# Patient Record
Sex: MALE | Race: WHITE | NOT HISPANIC OR LATINO | URBAN - METROPOLITAN AREA
[De-identification: names, ages, dates, MRNs, and addresses within clinical notes are randomized per-mention and may not be internally consistent; named-entity substitution may affect disease eponyms.]

---

## 2017-03-17 ENCOUNTER — OUTPATIENT (OUTPATIENT)
Dept: OUTPATIENT SERVICES | Facility: HOSPITAL | Age: 62
LOS: 1 days | Discharge: HOME | End: 2017-03-17

## 2017-06-27 DIAGNOSIS — M20.42 OTHER HAMMER TOE(S) (ACQUIRED), LEFT FOOT: ICD-10-CM

## 2017-06-27 DIAGNOSIS — I10 ESSENTIAL (PRIMARY) HYPERTENSION: ICD-10-CM

## 2021-05-05 ENCOUNTER — EMERGENCY (EMERGENCY)
Facility: HOSPITAL | Age: 66
LOS: 0 days | Discharge: HOME | End: 2021-05-06
Attending: EMERGENCY MEDICINE | Admitting: EMERGENCY MEDICINE
Payer: COMMERCIAL

## 2021-05-05 VITALS
OXYGEN SATURATION: 100 % | DIASTOLIC BLOOD PRESSURE: 90 MMHG | TEMPERATURE: 97 F | WEIGHT: 214.95 LBS | SYSTOLIC BLOOD PRESSURE: 177 MMHG | RESPIRATION RATE: 18 BRPM | HEART RATE: 100 BPM

## 2021-05-05 DIAGNOSIS — F17.200 NICOTINE DEPENDENCE, UNSPECIFIED, UNCOMPLICATED: ICD-10-CM

## 2021-05-05 DIAGNOSIS — I10 ESSENTIAL (PRIMARY) HYPERTENSION: ICD-10-CM

## 2021-05-05 DIAGNOSIS — R68.2 DRY MOUTH, UNSPECIFIED: ICD-10-CM

## 2021-05-05 DIAGNOSIS — K11.20 SIALOADENITIS, UNSPECIFIED: ICD-10-CM

## 2021-05-05 DIAGNOSIS — M54.2 CERVICALGIA: ICD-10-CM

## 2021-05-05 LAB
BASOPHILS # BLD AUTO: 0.07 K/UL — SIGNIFICANT CHANGE UP (ref 0–0.2)
BASOPHILS NFR BLD AUTO: 0.6 % — SIGNIFICANT CHANGE UP (ref 0–1)
EOSINOPHIL # BLD AUTO: 0.05 K/UL — SIGNIFICANT CHANGE UP (ref 0–0.7)
EOSINOPHIL NFR BLD AUTO: 0.4 % — SIGNIFICANT CHANGE UP (ref 0–8)
HCT VFR BLD CALC: 39.5 % — LOW (ref 42–52)
HGB BLD-MCNC: 14.3 G/DL — SIGNIFICANT CHANGE UP (ref 14–18)
IMM GRANULOCYTES NFR BLD AUTO: 0.3 % — SIGNIFICANT CHANGE UP (ref 0.1–0.3)
LYMPHOCYTES # BLD AUTO: 2.44 K/UL — SIGNIFICANT CHANGE UP (ref 1.2–3.4)
LYMPHOCYTES # BLD AUTO: 21.1 % — SIGNIFICANT CHANGE UP (ref 20.5–51.1)
MCHC RBC-ENTMCNC: 32.2 PG — HIGH (ref 27–31)
MCHC RBC-ENTMCNC: 36.2 G/DL — SIGNIFICANT CHANGE UP (ref 32–37)
MCV RBC AUTO: 89 FL — SIGNIFICANT CHANGE UP (ref 80–94)
MONOCYTES # BLD AUTO: 0.81 K/UL — HIGH (ref 0.1–0.6)
MONOCYTES NFR BLD AUTO: 7 % — SIGNIFICANT CHANGE UP (ref 1.7–9.3)
NEUTROPHILS # BLD AUTO: 8.17 K/UL — HIGH (ref 1.4–6.5)
NEUTROPHILS NFR BLD AUTO: 70.6 % — SIGNIFICANT CHANGE UP (ref 42.2–75.2)
NRBC # BLD: 0 /100 WBCS — SIGNIFICANT CHANGE UP (ref 0–0)
PLATELET # BLD AUTO: 305 K/UL — SIGNIFICANT CHANGE UP (ref 130–400)
RBC # BLD: 4.44 M/UL — LOW (ref 4.7–6.1)
RBC # FLD: 11.9 % — SIGNIFICANT CHANGE UP (ref 11.5–14.5)
WBC # BLD: 11.58 K/UL — HIGH (ref 4.8–10.8)
WBC # FLD AUTO: 11.58 K/UL — HIGH (ref 4.8–10.8)

## 2021-05-05 PROCEDURE — 99285 EMERGENCY DEPT VISIT HI MDM: CPT

## 2021-05-05 RX ORDER — SODIUM CHLORIDE 9 MG/ML
1000 INJECTION INTRAMUSCULAR; INTRAVENOUS; SUBCUTANEOUS ONCE
Refills: 0 | Status: COMPLETED | OUTPATIENT
Start: 2021-05-05 | End: 2021-05-05

## 2021-05-05 NOTE — ED PROVIDER NOTE - OBJECTIVE STATEMENT
64 yo male hx of HTN/DM/CAD s/p stent (about 1 year ago on Brilinta)/ BPH/ kidney stone 10 years ago present c/o right flank pain and hematuria since yesterday. Reported multiple bloody urine (pink to dark pink) since yesterday. pain to right lower flank and radiate to his right leg since this am. pain subsided in the past few days. Denies dysuria/urgency/frequency. denies penile discharge/lesion and testicles pain and swelling. Denies fever/chill/HA/dizziness/chest pain/palpitation/sob/abd pain/n/v/d/ black stool/bloody stool 64 yo male hx of HTN present c/o right sided neck pain started around 6 pm after dinner tonight. also c/o dried mouth. reported he was fasting for the holiday and started eating and feeling right sided neck pain/swelling tonight. denies similar episode in the past. denies fever/chill/recent illness/coughing/chest pain/abd pain/n/v/d,

## 2021-05-05 NOTE — ED PROVIDER NOTE - NS ED ROS FT
Constitutional: no fever, chills, no recent weight loss, change in appetite or malaise  Eyes: no redness/discharge/pain/vision changes  ENT: no rhinorrhea/ear pain/sore throat  Cardiac: No chest pain, SOB or edema.  Respiratory: No cough or respiratory distress  GI: No nausea, vomiting, diarrhea or abdominal pain.  : see HPI  MS: no pain to back or extremities, no loss of ROM, no weakness  Neuro: No headache or weakness. No LOC.  Skin: No skin rash.  Endocrine: No history of thyroid disease or diabetes. Constitutional: no fever, chills, no recent weight loss, change in appetite or malaise  Eyes: no redness/discharge/pain/vision changes  ENT: see HPI  Cardiac: No chest pain, SOB or edema.  Respiratory: No cough or respiratory distress  GI: No nausea, vomiting, diarrhea or abdominal pain.  MS: no pain to back or extremities, no loss of ROM, no weakness  Neuro: No headache or weakness. No LOC.  Skin: No skin rash.  Endocrine: No history of thyroid disease or diabetes.

## 2021-05-05 NOTE — ED PROVIDER NOTE - PATIENT PORTAL LINK FT
You can access the FollowMyHealth Patient Portal offered by [facility] by registering at the following website: [email or web address]. By joining [employer]’s FollowMyHealth portal, you will also be able to view your health information using other applications (apps) compatible with our system.

## 2021-05-05 NOTE — ED PROVIDER NOTE - ATTENDING CONTRIBUTION TO CARE
65M h/o htn p/w R neck swelling x 1d. Has been fasting, did not eat anything since early this morning, then ate dinner around 8:30pm after which dvlpd R submandibular/ant neck swelling. No other sx. No f/c, diff swallowing or breathing. PCP Mehnaz.    PE:  nad  skin warm, dry  ncat  perrl/eomi  ent- no facial swelling, op clear pharynx nl, no stridor/drooling  neck- +swelling to R submandibular/ant neck, mobile, mild ttp, trachea midline  rrr nl s1s2 no mrg  ctab no wrr  abd soft ntnd no palpable masses no rgr  back non-tender no cvat  ext no cce dpi  neuro aaox3 grossly nf exam

## 2021-05-05 NOTE — ED PROVIDER NOTE - PMH
BPH (benign prostatic hyperplasia)    CAD (coronary artery disease)    Diabetes mellitus    HTN (hypertension)    Kidney stone     HTN (hypertension)

## 2021-05-05 NOTE — ED PROVIDER NOTE - PROGRESS NOTE DETAILS
incidental CT finding regarding carotid disclosed to patient. f/u with PMD for outpatient carotid duplex. copy of result given to patient.

## 2021-05-05 NOTE — ED PROVIDER NOTE - CARE PROVIDER_API CALL
Swapnil Darby [facility]  [address] [place] [address]  Phone: [phone]  Fax: [phone]  Follow Up Time:     Reno Mcdonnell)  Otolaryngology  [address] [place] [address], [address] [place] [address]  Phone: [phone]  Fax: [phone]  Follow Up Time:

## 2021-05-05 NOTE — ED PROVIDER NOTE - NSFOLLOWUPINSTRUCTIONS_ED_ALL_ED_FT
Sialoadenitis    WHAT YOU NEED TO KNOW:    Sialoadenitis is an inflammation or infection of one or more of your salivary glands. A small stone can block the salivary gland and cause inflammation. Infection may be caused by a virus or bacteria. You can develop sialoadenitis on one or both sides of your face.     DISCHARGE INSTRUCTIONS:    Return to the emergency department if:   •You have trouble breathing or swallowing because of swelling.          Call your doctor if:   •You have trouble opening your mouth because of swelling.      •Your salivary gland gets more red and hot or drains more pus.      •The pain and swelling do not go away within 2 days, or they get worse.       •Your mouth is very dry.       •You lose movement on one side of your face.       •You have questions about your condition or care.      Medicines: You may need one or more of the following:   •Antibiotics may be given to treat a bacterial infection.       •Acetaminophen decreases pain and fever. It is available without a doctor's order. Ask how much to give your child and how often to give it. Follow directions. Read the labels of all other medicines your child uses to see if they also contain acetaminophen, or ask your child's doctor or pharmacist. Acetaminophen can cause liver damage if not taken correctly.      •NSAIDs, such as ibuprofen, help decrease swelling, pain, and fever. This medicine is available with or without a doctor's order. NSAIDs can cause stomach bleeding or kidney problems in certain people. If you take blood thinner medicine, always ask your healthcare provider if NSAIDs are safe for you. Always read the medicine label and follow directions.      •Take your medicine as directed. Contact your healthcare provider if you think your medicine is not helping or if you have side effects. Tell him of her if you are allergic to any medicine. Keep a list of the medicines, vitamins, and herbs you take. Include the amounts, and when and why you take them. Bring the list or the pill bottles to follow-up visits. Carry your medicine list with you in case of an emergency.      Manage or prevent sialoadenitis:   •Drink liquids as directed. You may need to drink more liquids than usual. Ask how much liquid to drink each day and which liquids are best for you. Good choices of liquids for most people include water, tea, soup, juice, or milk.      •Practice good oral care. Brush your teeth 2 times a day, 1 time in the morning and 1 time in the evening. Use a fluoride toothpaste. Floss your teeth 1 time each day, usually in the evening. Use mouthwash after you floss. Swish it around in your mouth for 30 seconds and spit it out.      •Keep your mouth moist. Suck on hard candy or chew sugarless gum to get your saliva flowing. Sour and tart flavors such as lemon and orange will help get saliva to flow. This will help keep your mouth moist and help push out a stone blocking your salivary duct.      •Apply a warm, wet cloth and massage the swollen area as directed. This may help relieve swelling and pain by pushing the pus out of the gland.       Follow up with your doctor or dentist as directed: Write down your questions so you remember to ask them during your visits.

## 2021-05-05 NOTE — ED PROVIDER NOTE - PHYSICAL EXAMINATION
CONSTITUTIONAL: Well-appearing; well-nourished; in no apparent distress.   EYES: PERRL; EOM intact.   CARDIOVASCULAR: Normal S1, S2; no murmurs, rubs, or gallops.   RESPIRATORY: Normal chest excursion with respiration; breath sounds clear and equal bilaterally; no wheezes, rhonchi, or rales.  GI/: Normal bowel sounds; non-distended; non-tender; no palpable organomegaly.   MS: No calf swelling and tenderness. No midline tenderness to neck/back.   SKIN: Normal for age and race; warm; dry; good turgor; no apparent lesions or exudate.   NEURO/PSYCH: A & O x 4; grossly unremarkable. CONSTITUTIONAL: Well-appearing; well-nourished; in no apparent distress.   EYES: PERRL; EOM intact.   ENT: normal nose; no rhinorrhea; normal pharynx with no tonsillar hypertrophy. no palpable stone to salivary duct opening   NECK: + swelling/tenderness right sided of neck and submandibular gland.   CARDIOVASCULAR: Normal S1, S2; no murmurs, rubs, or gallops.   RESPIRATORY: Normal chest excursion with respiration; breath sounds clear and equal bilaterally; no wheezes, rhonchi, or rales.  GI/: Normal bowel sounds; non-distended; non-tender; no palpable organomegaly.   MS: No evidence of trauma or deformity to extremities.  SKIN: No skin changes over the neck  NEURO/PSYCH: A & O x 4; grossly unremarkable.

## 2021-05-06 DIAGNOSIS — Z95.5 PRESENCE OF CORONARY ANGIOPLASTY IMPLANT AND GRAFT: Chronic | ICD-10-CM

## 2021-05-06 LAB
ALBUMIN SERPL ELPH-MCNC: 4.4 G/DL — SIGNIFICANT CHANGE UP (ref 3.5–5.2)
ALP SERPL-CCNC: 48 U/L — SIGNIFICANT CHANGE UP (ref 30–115)
ALT FLD-CCNC: 17 U/L — SIGNIFICANT CHANGE UP (ref 0–41)
ANION GAP SERPL CALC-SCNC: 10 MMOL/L — SIGNIFICANT CHANGE UP (ref 7–14)
AST SERPL-CCNC: 32 U/L — SIGNIFICANT CHANGE UP (ref 0–41)
BILIRUB SERPL-MCNC: 0.5 MG/DL — SIGNIFICANT CHANGE UP (ref 0.2–1.2)
BUN SERPL-MCNC: 16 MG/DL — SIGNIFICANT CHANGE UP (ref 10–20)
CALCIUM SERPL-MCNC: 9.5 MG/DL — SIGNIFICANT CHANGE UP (ref 8.5–10.1)
CHLORIDE SERPL-SCNC: 100 MMOL/L — SIGNIFICANT CHANGE UP (ref 98–110)
CO2 SERPL-SCNC: 23 MMOL/L — SIGNIFICANT CHANGE UP (ref 17–32)
CREAT SERPL-MCNC: 0.8 MG/DL — SIGNIFICANT CHANGE UP (ref 0.7–1.5)
GLUCOSE SERPL-MCNC: 85 MG/DL — SIGNIFICANT CHANGE UP (ref 70–99)
POTASSIUM SERPL-MCNC: 5 MMOL/L — SIGNIFICANT CHANGE UP (ref 3.5–5)
POTASSIUM SERPL-SCNC: 5 MMOL/L — SIGNIFICANT CHANGE UP (ref 3.5–5)
PROT SERPL-MCNC: 6.5 G/DL — SIGNIFICANT CHANGE UP (ref 6–8)
SODIUM SERPL-SCNC: 133 MMOL/L — LOW (ref 135–146)

## 2021-05-06 PROCEDURE — 70491 CT SOFT TISSUE NECK W/DYE: CPT | Mod: 26,MA

## 2021-05-06 RX ADMIN — SODIUM CHLORIDE 1000 MILLILITER(S): 9 INJECTION INTRAMUSCULAR; INTRAVENOUS; SUBCUTANEOUS at 01:06

## 2021-05-06 RX ADMIN — Medication 1 TABLET(S): at 02:10

## 2021-05-06 NOTE — ED ADULT NURSE NOTE - MODE OF DISCHARGE
Ambulatory Patient states the Gabapentin that was sent on 11/25 was not received by the pharmacy. E-Prescribing Status: Transmission to pharmacy failed (11/25/2020 Â 4:33 PM CST)    Advised patient I will re-send this to her pharmacy   Dieudonne Liang verbalized understanding of information given.

## 2021-05-06 NOTE — ED ADULT NURSE NOTE - CHIEF COMPLAINT QUOTE
pt complaining of right neck swelling that happened all of the sudden after eating dinner around 8:30 PM. pt denies any shortness of breath. no lip swelling.

## 2021-05-11 ENCOUNTER — APPOINTMENT (OUTPATIENT)
Dept: OTOLARYNGOLOGY | Facility: CLINIC | Age: 66
End: 2021-05-11

## 2021-12-01 NOTE — ED ADULT NURSE NOTE - BREATHING, MLM
[facility]    Progress Note       Date of Admission:  11/30/2021    Assessment & Plan             Red Sutherland is a 81 year old male w/ PMH of HFpEF, ischemic cardiomyopathy, Afib, CAD s/p HUNTER, NSTEMI, COPD on home O2 4L, NOVA on CPAP, pulmonary HTN, CKD3, cirrhosis, T2DM on insulin admitted on 11/30/2021 for dyspnea with presentation most consistent with CHF exacerbation.       Acute CHF exacerbation  HFpEF in setting of ischemic cardiomyopathy  Anasarca  Hydrocele  Presents with dyspnea, peripheral edema, cough, fatigue weight gain, anasarca, and hydrocele. Dry weight of 212 lb; weight upon admission of 242 lb, no change in weight. Most recent echocardiogram in 10/23/2021 revealed LVEF 60-65%. Can expect decreased hypervolemia after paracentesis. After that is evaluated, can reassess Bumex and determine if increase is needed. No significant change to BUN and Cr.   - Paracentesis with gram staining  - Continuous pulse oximetry  - Oxygen support as needed, SpO2 goal >90%  - Diuresis with bumetanide IV 4mg BID, can consider increase to 5mg BID (not to exceed max of 10mg daily)  - Hold PTA bumex  - BMP to trend  - Strict I/Os, daily weights  - Hold PTA irbesartan in setting of NATHALY  - Continue PTA metoprolol tartrate  - Continue PTA spironolactone   - Continue PTA Digoxin  - Hold PTA KCl  - Potassium replacement protocol  - Mg  -Bilateral LE US to rule out DVT  -Cardiology consulted, appreciate recommendations:   -Start Bumex 2 mg IV every 8.  Consider metolazone if need increased diuresis.   -following up upon discharge and her heart failure clinic within a week.  Follow-up with Dr. French his primary cardiologist in 4 to 6 weeks.  Can continue to pursue watchman device as outpatient   -Recommend Plavix and Eliquis for anticoagulation.  Patient is reluctant to be on Eliquis at this time despite understanding the risks.    Cirrhosis  Ascites   LFTs normal; albumen mildly low at 3.3;  INR 1.3. Abdominal CT noted Cirrhosis with interval development of ascites right and left upper quadrants and in the pelvis, and subcutaneous edema. Reports no history of alcohol use disorder, IV drug use, viral hepatitis, or family history of liver disease.  - Viral hepatitis panel  - Ferritin   - If workup negative, recommend outpatient GI referral     Atrial fibrillation, permanent  Rate controlled & on anticoagulation.  Currently in junctional rhythm. Was refered for a watchman device due to recent GI bleed.  - Hold PTA apixaban as patient declined due to concern for GI bleed; recommended by both medicine team and cardiology, though patient declined     CAD s/p PCI w/ HUNTER of left circumflex  NSTEMI  HTN  HLD  PVD  Was discontinued off aspirin & Effient on 11/18/21. Patient is scheduled for Ct Cardiac Morphology on 12/1/21.  - Continue PTA atorvastatin  - Continue PTA Plavix post procedure      CPAP & home O2 of 4L  NOVA  Severe pulmonary hypertension   Secondary to underlying lung disease and left heart disease  - Continue PTA tadalafil 20mg PO daily  - Discontinue PTA nitroglycerin SL PRN due to potential severe hypotension with tadalafil  - Continue PTA albuterol PRN  - Continue PTA Breo Ellipta  - CPAP overnight  - Supplemental O2     NATHALY  CKD 3  Cr 1.62, eGFR 40, BUN 39 on admission, has been stable; baseline Cr 1.2-1.4. BUN/Cr ratio of ~25, so likely prerenal in setting of CHF exacerbation. Increase also likely secondary to increased diuretic.  - Avoid nephrotoxic as able    T2DM  Chronic pancreatitis  Obesity, BMI 33  Admission glucose of 199.  - Continue PTA invokana  - Continue PTA insulin glaragine at half of home dose  - Medium liding scale insulin  - Hold PTA metformin     Hx GI Bleed  Anemia, microcytic - stable  Iron deficiency anemia  Patient was admitted from 10/29-11/1 for GI bleed with melena due to small AVM and internal hemorrhoids. Presented with dark stools and found to have a hemoglobin of 8  -dropped from 10.  Seen by GI and had a normal EGD on 10/30.  Had colonoscopy which revealed AVM which was treated with APC. Hgb 7.9 on admission; was 8.1 when discharged following GI bleed.  - Continue PTA ferrous glaconate     Scrotal fungal rash  - Clotrimazole     Restless leg  - Continue PTA ropinirole     Insomnia  - Continue PTA zolpidem     GERD  - Continue PTA omeprazole      Diet: Combination Diet Low Saturated Fat Na <2400mg Diet, No Caffeine Diet    DVT Prophylaxis: DOAC and Pneumatic Compression Devices  Dixon Catheter: Not present  Fluids: PO  Central Lines: None  Code Status: No CPR- Do NOT Intubate      Disposition Plan   Expected discharge: 12/04/2021   recommended to prior living arrangement once CHF exacerbation improved.     The patient's care was discussed with the Attending Physician, Dr. Plata.    Deirdre Valdovinos MD  [facility]      ______________________________________________________________________    Interval History   Afebrile, on baseline 4 L of oxygen supplementation, and VS. Per patient: Has been urinating well, though less so than past CHF exacerbations and is wondering about increased diuresis. NO change to SOB, though some improvement in hydrocele. No chest pain. No abdominal pain. No change to lower extremity edema, continued tenderness particularly on R side.     Data reviewed today: I reviewed all medications, new labs and imaging results over the last 24 hours. I personally reviewed no images or EKG's today.    Physical Exam   Vital Signs: Temp: 98.4  F (36.9  C) Temp src: Oral BP: 125/57 Pulse: 65   Resp: 18 SpO2: 100 % O2 Device: None (Room air) Oxygen Delivery: 4 LPM  Weight: 242 lbs 6.4 oz  Physical Exam  Constitutional:       General: He is not in acute distress.     Appearance: Normal appearance. He is not ill-appearing or diaphoretic.   Eyes:      Extraocular Movements: Extraocular movements intact.      Conjunctiva/sclera: Conjunctivae normal.  Spontaneous, unlabored and symmetrical   Cardiovascular:      Rate and Rhythm: Normal rate and regular rhythm.      Comments: 3/6 decrescendo systolic murmur  Pulmonary:      Effort: Pulmonary effort is normal.      Comments: Mild crackles on L lower lung field, otherwise clear with good air movement. No wheezes  Abdominal:      General: There is distension.      Tenderness: There is no guarding or rebound.      Comments: Mild tenderness to LLQ   Genitourinary:     Comments:  Hydrocele, beefy red appearance particularly over anterior aspect without rash or lesions  Musculoskeletal:      Cervical back: Normal range of motion.      Comments: 3+ pitting edema up to abdomen on L side. 3+ to knee on R. L>R. Tenderness to palpation bilaterally, R>L   Skin:     General: Skin is warm and dry.   Neurological:      General: No focal deficit present.      Mental Status: He is alert and oriented to person, place, and time.   Psychiatric:         Mood and Affect: Mood normal.         Behavior: Behavior normal.         Data   Recent Labs   Lab 12/01/21  0617 12/01/21  0556 12/01/21  0148 11/30/21  1609 11/26/21  1918   WBC  --   --   --  7.7 7.2   HGB  --   --   --  7.9* 8.1*   MCV  --   --   --  74* 74*   PLT  --   --   --  211 186   INR  --   --   --  1.30*  --    NA  --  138  --  140 142   POTASSIUM  --  4.1  --  4.4 4.0   CHLORIDE  --  105  --  108* 110*   CO2  --  24  --  23 18*   BUN  --  36*  --  39* 37*   CR  --  1.50*  --  1.62* 1.49*   ANIONGAP  --  9  --  9 14   ANNMARIE  --  9.2  --  8.9 8.4*   * 193* 166* 199* 153*   ALBUMIN  --   --   --  3.3*  --    PROTTOTAL  --   --   --  6.2  --    BILITOTAL  --   --   --  1.9*  --    ALKPHOS  --   --   --  117  --    ALT  --   --   --  13  --    AST  --   --   --  18  --      Recent Results (from the past 24 hour(s))   XR Chest Port 1 View    Narrative    EXAM: XR CHEST PORT 1 VIEW  LOCATION: [facility]  DATE/TIME: 11/30/2021 4:28 PM    INDICATION: shortness of breath, concern for chf  exacerbation  COMPARISON: 10/23/2021 and older studies.      Impression    IMPRESSION: Large body habitus. There is cardiomegaly, pulmonary vascular congestion and mild interstitial edema. Previously noted airspace opacities distributed throughout both lungs have resolved consistent with a resolved pneumonia.   CT Abdomen Pelvis w/o Contrast    Narrative    EXAM: CT ABDOMEN PELVIS W/O CONTRAST  LOCATION: [facility]  DATE/TIME: 11/30/2021 4:54 PM    INDICATION: Abdominal distension  COMPARISON: CT chest, abdomen and pelvis 07/11/2021 and CT chest 10/12/2021  TECHNIQUE: CT scan of the abdomen and pelvis was performed without IV contrast. Multiplanar reformats were obtained. Dose reduction techniques were used.  CONTRAST: None.    FINDINGS:   LOWER CHEST: Moderate right pleural effusion has slightly increased in size since 10/12/2021. Small left pleural effusion with minimal change. Bilateral pulmonary infiltrates and septal thickening have improved. Scattered nodules both lungs as seen   previously including a 1.3 cm nodule right middle lobe, incompletely imaged. Underlying emphysema. Cardiomegaly.    HEPATOBILIARY: Cirrhotic changes of the liver. Small amount of ascites along the liver margin has increased. Cholecystectomy.    PANCREAS: Diffuse calcifications consistent with chronic pancreatitis, unchanged.    SPLEEN: Small amount of ascites left upper quadrant adjacent to the spleen.    ADRENAL GLANDS: Normal.    KIDNEYS/BLADDER: No renal calculi or hydronephrosis. 3.5 cm left renal cyst.     BOWEL: Diverticulosis sigmoid colon, without evidence for diverticulitis or bowel obstruction. Diffuse gastric wall thickening, incompletely distended.    LYMPH NODES: Normal.    VASCULATURE: Severe atherosclerotic disease abdominal aorta and iliac arteries.    PELVIC ORGANS: Small amount of pelvic ascites.    MUSCULOSKELETAL: Total left hip arthroplasty. Advanced degenerative disc disease lower  thoracic and lumbar spine. Edematous changes subcutaneous tissues is new.      Impression    IMPRESSION:   1.  No evidence for bowel obstruction.  2.  Gastric wall thickening could be related to incomplete distention. Gastritis could have a similar appearance.  3.  Cirrhosis with interval development of ascites right and left upper quadrants and in the pelvis, and subcutaneous edema.  4.  Chronic pancreatitis.  5.  Advanced atherosclerotic disease.  6.  Infiltrates both lower lung fields have improved. Moderate size right pleural effusion is slightly larger.

## 2022-03-02 ENCOUNTER — LABORATORY RESULT (OUTPATIENT)
Age: 67
End: 2022-03-02

## 2022-03-02 DIAGNOSIS — R82.90 UNSPECIFIED ABNORMAL FINDINGS IN URINE: ICD-10-CM

## 2022-03-02 DIAGNOSIS — R31.9 HEMATURIA, UNSPECIFIED: ICD-10-CM

## 2022-03-03 ENCOUNTER — NON-APPOINTMENT (OUTPATIENT)
Age: 67
End: 2022-03-03

## 2022-03-03 LAB
APPEARANCE: CLEAR
BILIRUBIN URINE: NEGATIVE
BLOOD URINE: ABNORMAL
COLOR: YELLOW
GLUCOSE QUALITATIVE U: NEGATIVE
KETONES URINE: NEGATIVE
LEUKOCYTE ESTERASE URINE: NEGATIVE
NITRITE URINE: NEGATIVE
PH URINE: 6.5
PROTEIN URINE: NEGATIVE
PSA FREE FLD-MCNC: 23 %
PSA FREE SERPL-MCNC: 0.35 NG/ML
PSA SERPL-MCNC: 1.52 NG/ML
SPECIFIC GRAVITY URINE: 1.02
UROBILINOGEN URINE: NORMAL

## 2023-03-02 NOTE — ED PROVIDER NOTE - CARE PROVIDERS DIRECT ADDRESSES
,[email or web address],[email or web address]
Eyeglasses

## 2023-03-30 PROBLEM — I10 ESSENTIAL (PRIMARY) HYPERTENSION: Chronic | Status: ACTIVE | Noted: 2021-05-06

## 2023-04-07 ENCOUNTER — APPOINTMENT (OUTPATIENT)
Dept: ORTHOPEDIC SURGERY | Facility: CLINIC | Age: 68
End: 2023-04-07
Payer: MEDICARE

## 2023-04-07 VITALS — BODY MASS INDEX: 27.35 KG/M2 | RESPIRATION RATE: 16 BRPM | WEIGHT: 220 LBS | HEIGHT: 75 IN

## 2023-04-07 DIAGNOSIS — S69.90XA UNSPECIFIED INJURY OF UNSPECIFIED WRIST, HAND AND FINGER(S), INITIAL ENCOUNTER: ICD-10-CM

## 2023-04-07 DIAGNOSIS — Z87.19 PERSONAL HISTORY OF OTHER DISEASES OF THE DIGESTIVE SYSTEM: ICD-10-CM

## 2023-04-07 DIAGNOSIS — F17.200 NICOTINE DEPENDENCE, UNSPECIFIED, UNCOMPLICATED: ICD-10-CM

## 2023-04-07 DIAGNOSIS — Z78.9 OTHER SPECIFIED HEALTH STATUS: ICD-10-CM

## 2023-04-07 PROCEDURE — 99203 OFFICE O/P NEW LOW 30 MIN: CPT

## 2023-04-07 PROCEDURE — 73140 X-RAY EXAM OF FINGER(S): CPT | Mod: 59,RT

## 2023-04-07 PROCEDURE — 73130 X-RAY EXAM OF HAND: CPT | Mod: RT

## 2023-04-11 ENCOUNTER — OUTPATIENT (OUTPATIENT)
Dept: OUTPATIENT SERVICES | Facility: HOSPITAL | Age: 68
LOS: 1 days | End: 2023-04-11
Payer: MEDICARE

## 2023-04-11 DIAGNOSIS — S69.90XA UNSPECIFIED INJURY OF UNSPECIFIED WRIST, HAND AND FINGER(S), INITIAL ENCOUNTER: ICD-10-CM

## 2023-04-11 PROCEDURE — 97165 OT EVAL LOW COMPLEX 30 MIN: CPT | Mod: GO

## 2023-04-11 PROCEDURE — L3933: CPT

## 2023-04-11 PROCEDURE — 97760 ORTHOTIC MGMT&TRAING 1ST ENC: CPT | Mod: GO

## 2023-04-12 DIAGNOSIS — S69.90XA UNSPECIFIED INJURY OF UNSPECIFIED WRIST, HAND AND FINGER(S), INITIAL ENCOUNTER: ICD-10-CM

## 2023-06-30 DIAGNOSIS — S69.90XD UNSPECIFIED INJURY OF UNSPECIFIED WRIST, HAND AND FINGER(S), SUBSEQUENT ENCOUNTER: ICD-10-CM

## 2024-02-27 ENCOUNTER — APPOINTMENT (OUTPATIENT)
Dept: CARDIOLOGY | Facility: CLINIC | Age: 69
End: 2024-02-27
Payer: MEDICARE

## 2024-02-27 VITALS
DIASTOLIC BLOOD PRESSURE: 60 MMHG | BODY MASS INDEX: 26.36 KG/M2 | HEART RATE: 75 BPM | HEIGHT: 75 IN | SYSTOLIC BLOOD PRESSURE: 120 MMHG | WEIGHT: 212 LBS

## 2024-02-27 DIAGNOSIS — Z82.49 FAMILY HISTORY OF ISCHEMIC HEART DISEASE AND OTHER DISEASES OF THE CIRCULATORY SYSTEM: ICD-10-CM

## 2024-02-27 DIAGNOSIS — R42 DIZZINESS AND GIDDINESS: ICD-10-CM

## 2024-02-27 DIAGNOSIS — I10 ESSENTIAL (PRIMARY) HYPERTENSION: ICD-10-CM

## 2024-02-27 PROCEDURE — 99204 OFFICE O/P NEW MOD 45 MIN: CPT | Mod: 25

## 2024-02-27 PROCEDURE — 93000 ELECTROCARDIOGRAM COMPLETE: CPT

## 2024-02-27 RX ORDER — PANTOPRAZOLE SODIUM 40 MG/1
40 GRANULE, DELAYED RELEASE ORAL
Refills: 0 | Status: ACTIVE | COMMUNITY

## 2024-02-27 NOTE — REVIEW OF SYSTEMS
[Negative] : Neurological [Dyspnea on exertion] : not dyspnea during exertion [Chest Discomfort] : no chest discomfort [Lower Ext Edema] : no extremity edema [Leg Claudication] : no intermittent leg claudication [Palpitations] : no palpitations [Anxiety] : no anxiety

## 2024-02-27 NOTE — PHYSICAL EXAM
[Well Developed] : well developed [Well Nourished] : well nourished [No Acute Distress] : no acute distress [Normal Conjunctiva] : normal conjunctiva [Normal Venous Pressure] : normal venous pressure [No Carotid Bruit] : no carotid bruit [Normal S1, S2] : normal S1, S2 [No Murmur] : no murmur [No Rub] : no rub [No Gallop] : no gallop [Clear Lung Fields] : clear lung fields [Good Air Entry] : good air entry [No Respiratory Distress] : no respiratory distress  [Soft] : abdomen soft [Non Tender] : non-tender [Normal Bowel Sounds] : normal bowel sounds [Normal Gait] : normal gait [No Edema] : no edema [No Cyanosis] : no cyanosis [No Clubbing] : no clubbing [No Rash] : no rash [No Varicosities] : no varicosities [Moves all extremities] : moves all extremities [No Focal Deficits] : no focal deficits [Normal Speech] : normal speech [Alert and Oriented] : alert and oriented [Normal memory] : normal memory

## 2024-02-27 NOTE — HISTORY OF PRESENT ILLNESS
[FreeTextEntry1] : 68-year-old male smoker 1/2 PPD, with a PMHx of HTN, GERD  Pt presents for a cardiac evaluation. Having frequent episodes of hypotension 100s/60s and dizziness/headache and generalized fatigue. does not take daily walks like he used to.  Denies chest pain, SOB, or palpitations.   GFR 97 LDL 99 A1c 5.3 TSH 0.93.

## 2024-02-27 NOTE — ASSESSMENT
[FreeTextEntry1] : 68 YOM HTN controlled with symptomatic episodes of low BP. PVCs.  Plan: Low-salt diet d/w patient. Continue Labetalol 300 mg bid. Continue Amlodipine 5 mg in the evening. Reduce Valsartan HCT to 320/12.5 mg in the morning. Will obtain records from PCP (ECHO, [facility]). 2D ECHO. F/u in 3 weeks.  Sohan Bates MD

## 2024-03-15 ENCOUNTER — APPOINTMENT (OUTPATIENT)
Dept: CARDIOLOGY | Facility: CLINIC | Age: 69
End: 2024-03-15
Payer: MEDICARE

## 2024-03-15 PROCEDURE — 93306 TTE W/DOPPLER COMPLETE: CPT

## 2024-03-29 ENCOUNTER — APPOINTMENT (OUTPATIENT)
Dept: CARDIOLOGY | Facility: CLINIC | Age: 69
End: 2024-03-29
Payer: MEDICARE

## 2024-03-29 VITALS
WEIGHT: 211 LBS | BODY MASS INDEX: 26.24 KG/M2 | DIASTOLIC BLOOD PRESSURE: 70 MMHG | HEIGHT: 75 IN | SYSTOLIC BLOOD PRESSURE: 126 MMHG | HEART RATE: 79 BPM

## 2024-03-29 PROCEDURE — 99214 OFFICE O/P EST MOD 30 MIN: CPT | Mod: 25

## 2024-03-29 PROCEDURE — 93000 ELECTROCARDIOGRAM COMPLETE: CPT

## 2024-03-29 RX ORDER — LABETALOL HYDROCHLORIDE 300 MG/1
300 TABLET, FILM COATED ORAL
Refills: 0 | Status: DISCONTINUED | COMMUNITY
End: 2024-03-29

## 2024-03-29 NOTE — PHYSICAL EXAM
[Well Developed] : well developed [Well Nourished] : well nourished [Normal Conjunctiva] : normal conjunctiva [No Acute Distress] : no acute distress [Normal Venous Pressure] : normal venous pressure [Normal S1, S2] : normal S1, S2 [No Carotid Bruit] : no carotid bruit [No Murmur] : no murmur [No Rub] : no rub [No Gallop] : no gallop [No Respiratory Distress] : no respiratory distress  [Clear Lung Fields] : clear lung fields [Good Air Entry] : good air entry [Soft] : abdomen soft [Non Tender] : non-tender [Normal Gait] : normal gait [Normal Bowel Sounds] : normal bowel sounds [No Clubbing] : no clubbing [No Cyanosis] : no cyanosis [No Edema] : no edema [No Rash] : no rash [No Varicosities] : no varicosities [Moves all extremities] : moves all extremities [No Focal Deficits] : no focal deficits [Normal Speech] : normal speech [Normal memory] : normal memory [Alert and Oriented] : alert and oriented

## 2024-03-29 NOTE — HISTORY OF PRESENT ILLNESS
[FreeTextEntry1] : 68-year-old male smoker 1/2 PPD, with a PMHx of HTN, GERD  Pt presents for a follow up. Denies chest pain, SOB, or palpitations. BP has been normal at home but feels weak/fatigue from labetalol.  2D ECHO: LVEF 44% G1DD Ascending aorta 4  GFR 97 LDL 99 A1c 5.3 TSH 0.93.

## 2024-03-29 NOTE — ASSESSMENT
[FreeTextEntry1] : 68 YOM HTN controlled with symptomatic episodes of low BP. PVCs. Mild LV dysfunction, etiology unclear. Hypertensive versus ischemic HD, ? myocarditis (recurrent febrile illness in the last few months)  Plan: Low-salt diet d/w patient. Switch from Labetalol 300 mg bid to Carvedilol 12.5mg BID Continue Amlodipine 5 mg in the evening. Valsartan /12.5 mg in the morning. Will schedule LHC at [facility]. Will need cardiac MRI if CAD is ruled out.  F/u after the procedure.  Sohan Bates MD

## 2024-04-01 DIAGNOSIS — Z00.00 ENCOUNTER FOR GENERAL ADULT MEDICAL EXAMINATION W/OUT ABNORMAL FINDINGS: ICD-10-CM

## 2024-04-04 LAB
ANION GAP SERPL CALC-SCNC: 15 MMOL/L
BUN SERPL-MCNC: 13 MG/DL
CALCIUM SERPL-MCNC: 9.7 MG/DL
CHLORIDE SERPL-SCNC: 91 MMOL/L
CO2 SERPL-SCNC: 24 MMOL/L
CREAT SERPL-MCNC: 0.9 MG/DL
EGFR: 94 ML/MIN/1.73M2
GLUCOSE SERPL-MCNC: 125 MG/DL
HCT VFR BLD CALC: 38.9 %
HGB BLD-MCNC: 13.7 G/DL
INR PPP: 0.97 RATIO
MCHC RBC-ENTMCNC: 32.2 PG
MCHC RBC-ENTMCNC: 35.2 G/DL
MCV RBC AUTO: 91.5 FL
PLATELET # BLD AUTO: 346 K/UL
PMV BLD AUTO: 0 /100 WBCS
PMV BLD: 9.2 FL
POTASSIUM SERPL-SCNC: 5.1 MMOL/L
PT BLD: 11 SEC
RBC # BLD: 4.25 M/UL
RBC # FLD: 12.1 %
SODIUM SERPL-SCNC: 130 MMOL/L
WBC # FLD AUTO: 8.93 K/UL

## 2024-04-08 ENCOUNTER — TRANSCRIPTION ENCOUNTER (OUTPATIENT)
Age: 69
End: 2024-04-08

## 2024-04-08 ENCOUNTER — OUTPATIENT (OUTPATIENT)
Dept: OUTPATIENT SERVICES | Facility: HOSPITAL | Age: 69
LOS: 1 days | Discharge: HOME | End: 2024-04-08
Payer: MEDICARE

## 2024-04-08 VITALS
TEMPERATURE: 98 F | SYSTOLIC BLOOD PRESSURE: 144 MMHG | WEIGHT: 220.02 LBS | HEIGHT: 75 IN | HEART RATE: 82 BPM | DIASTOLIC BLOOD PRESSURE: 80 MMHG | OXYGEN SATURATION: 97 % | RESPIRATION RATE: 14 BRPM

## 2024-04-08 VITALS
HEART RATE: 71 BPM | RESPIRATION RATE: 18 BRPM | DIASTOLIC BLOOD PRESSURE: 71 MMHG | SYSTOLIC BLOOD PRESSURE: 133 MMHG | OXYGEN SATURATION: 99 %

## 2024-04-08 DIAGNOSIS — I25.119 ATHEROSCLEROTIC HEART DISEASE OF NATIVE CORONARY ARTERY WITH UNSPECIFIED ANGINA PECTORIS: ICD-10-CM

## 2024-04-08 DIAGNOSIS — Z98.890 OTHER SPECIFIED POSTPROCEDURAL STATES: Chronic | ICD-10-CM

## 2024-04-08 PROBLEM — K21.9 GASTRO-ESOPHAGEAL REFLUX DISEASE WITHOUT ESOPHAGITIS: Chronic | Status: ACTIVE | Noted: 2024-04-05

## 2024-04-08 LAB
ANION GAP SERPL CALC-SCNC: 12 MMOL/L — SIGNIFICANT CHANGE UP (ref 7–14)
BUN SERPL-MCNC: 11 MG/DL — SIGNIFICANT CHANGE UP (ref 10–20)
CALCIUM SERPL-MCNC: 9.6 MG/DL — SIGNIFICANT CHANGE UP (ref 8.4–10.5)
CHLORIDE SERPL-SCNC: 93 MMOL/L — LOW (ref 98–110)
CO2 SERPL-SCNC: 23 MMOL/L — SIGNIFICANT CHANGE UP (ref 17–32)
CREAT SERPL-MCNC: 0.8 MG/DL — SIGNIFICANT CHANGE UP (ref 0.7–1.5)
EGFR: 98 ML/MIN/1.73M2 — SIGNIFICANT CHANGE UP
GLUCOSE SERPL-MCNC: 123 MG/DL — HIGH (ref 70–99)
HCT VFR BLD CALC: 33.5 % — LOW (ref 42–52)
HCT VFR BLD CALC: 38.4 % — LOW (ref 42–52)
HGB BLD-MCNC: 12.4 G/DL — LOW (ref 14–18)
HGB BLD-MCNC: 14 G/DL — SIGNIFICANT CHANGE UP (ref 14–18)
MCHC RBC-ENTMCNC: 32 PG — HIGH (ref 27–31)
MCHC RBC-ENTMCNC: 32.4 PG — HIGH (ref 27–31)
MCHC RBC-ENTMCNC: 36.5 G/DL — SIGNIFICANT CHANGE UP (ref 32–37)
MCHC RBC-ENTMCNC: 37 G/DL — SIGNIFICANT CHANGE UP (ref 32–37)
MCV RBC AUTO: 87.5 FL — SIGNIFICANT CHANGE UP (ref 80–94)
MCV RBC AUTO: 87.7 FL — SIGNIFICANT CHANGE UP (ref 80–94)
NRBC # BLD: 0 /100 WBCS — SIGNIFICANT CHANGE UP (ref 0–0)
NRBC # BLD: 0 /100 WBCS — SIGNIFICANT CHANGE UP (ref 0–0)
PLATELET # BLD AUTO: 270 K/UL — SIGNIFICANT CHANGE UP (ref 130–400)
PLATELET # BLD AUTO: 278 K/UL — SIGNIFICANT CHANGE UP (ref 130–400)
PMV BLD: 9.1 FL — SIGNIFICANT CHANGE UP (ref 7.4–10.4)
PMV BLD: 9.5 FL — SIGNIFICANT CHANGE UP (ref 7.4–10.4)
POTASSIUM SERPL-MCNC: 4.5 MMOL/L — SIGNIFICANT CHANGE UP (ref 3.5–5)
POTASSIUM SERPL-SCNC: 4.5 MMOL/L — SIGNIFICANT CHANGE UP (ref 3.5–5)
RBC # BLD: 3.83 M/UL — LOW (ref 4.7–6.1)
RBC # BLD: 4.38 M/UL — LOW (ref 4.7–6.1)
RBC # FLD: 11.9 % — SIGNIFICANT CHANGE UP (ref 11.5–14.5)
RBC # FLD: 11.9 % — SIGNIFICANT CHANGE UP (ref 11.5–14.5)
SODIUM SERPL-SCNC: 128 MMOL/L — LOW (ref 135–146)
WBC # BLD: 7.52 K/UL — SIGNIFICANT CHANGE UP (ref 4.8–10.8)
WBC # BLD: 9.52 K/UL — SIGNIFICANT CHANGE UP (ref 4.8–10.8)
WBC # FLD AUTO: 7.52 K/UL — SIGNIFICANT CHANGE UP (ref 4.8–10.8)
WBC # FLD AUTO: 9.52 K/UL — SIGNIFICANT CHANGE UP (ref 4.8–10.8)

## 2024-04-08 PROCEDURE — C1894: CPT

## 2024-04-08 PROCEDURE — 93005 ELECTROCARDIOGRAM TRACING: CPT

## 2024-04-08 PROCEDURE — C1887: CPT

## 2024-04-08 PROCEDURE — 93458 L HRT ARTERY/VENTRICLE ANGIO: CPT | Mod: 26

## 2024-04-08 PROCEDURE — C1725: CPT

## 2024-04-08 PROCEDURE — 93010 ELECTROCARDIOGRAM REPORT: CPT

## 2024-04-08 PROCEDURE — C1769: CPT

## 2024-04-08 PROCEDURE — 0523T NTRAPX C FFR W/3D FUNCJL MAP: CPT

## 2024-04-08 PROCEDURE — 85027 COMPLETE CBC AUTOMATED: CPT

## 2024-04-08 PROCEDURE — 80048 BASIC METABOLIC PNL TOTAL CA: CPT

## 2024-04-08 PROCEDURE — C1874: CPT

## 2024-04-08 PROCEDURE — 93454 CORONARY ARTERY ANGIO S&I: CPT | Mod: 59

## 2024-04-08 PROCEDURE — 92928 PRQ TCAT PLMT NTRAC ST 1 LES: CPT | Mod: LC

## 2024-04-08 PROCEDURE — C9600: CPT | Mod: LC

## 2024-04-08 PROCEDURE — 36415 COLL VENOUS BLD VENIPUNCTURE: CPT

## 2024-04-08 RX ORDER — CHLORHEXIDINE GLUCONATE 213 G/1000ML
1 SOLUTION TOPICAL ONCE
Refills: 0 | Status: DISCONTINUED | OUTPATIENT
Start: 2024-04-08 | End: 2024-04-08

## 2024-04-08 RX ORDER — ASPIRIN/CALCIUM CARB/MAGNESIUM 324 MG
1 TABLET ORAL
Qty: 30 | Refills: 3
Start: 2024-04-08 | End: 2024-08-05

## 2024-04-08 RX ORDER — CARVEDILOL PHOSPHATE 80 MG/1
1 CAPSULE, EXTENDED RELEASE ORAL
Refills: 0 | DISCHARGE

## 2024-04-08 RX ORDER — SODIUM CHLORIDE 9 MG/ML
1000 INJECTION INTRAMUSCULAR; INTRAVENOUS; SUBCUTANEOUS
Refills: 0 | Status: DISCONTINUED | OUTPATIENT
Start: 2024-04-08 | End: 2024-04-08

## 2024-04-08 RX ORDER — CLOPIDOGREL BISULFATE 75 MG/1
1 TABLET, FILM COATED ORAL
Qty: 30 | Refills: 3
Start: 2024-04-08 | End: 2024-08-05

## 2024-04-08 RX ORDER — ASPIRIN/CALCIUM CARB/MAGNESIUM 324 MG
324 TABLET ORAL ONCE
Refills: 0 | Status: COMPLETED | OUTPATIENT
Start: 2024-04-08 | End: 2024-04-08

## 2024-04-08 RX ORDER — ATORVASTATIN CALCIUM 80 MG/1
1 TABLET, FILM COATED ORAL
Qty: 30 | Refills: 3
Start: 2024-04-08 | End: 2024-08-05

## 2024-04-08 RX ORDER — SODIUM CHLORIDE 9 MG/ML
250 INJECTION INTRAMUSCULAR; INTRAVENOUS; SUBCUTANEOUS ONCE
Refills: 0 | Status: COMPLETED | OUTPATIENT
Start: 2024-04-08 | End: 2024-04-08

## 2024-04-08 RX ADMIN — SODIUM CHLORIDE 150 MILLILITER(S): 9 INJECTION INTRAMUSCULAR; INTRAVENOUS; SUBCUTANEOUS at 11:37

## 2024-04-08 RX ADMIN — Medication 324 MILLIGRAM(S): at 07:51

## 2024-04-08 RX ADMIN — SODIUM CHLORIDE 250 MILLILITER(S): 9 INJECTION INTRAMUSCULAR; INTRAVENOUS; SUBCUTANEOUS at 07:52

## 2024-04-08 NOTE — ASU PATIENT PROFILE, ADULT - FALL HARM RISK - UNIVERSAL INTERVENTIONS
Bed in lowest position, wheels locked, appropriate side rails in place/Call bell, personal items and telephone in reach/Instruct patient to call for assistance before getting out of bed or chair/Non-slip footwear when patient is out of bed/[place] to call system/Physically safe environment - no spills, clutter or unnecessary equipment/Purposeful Proactive Rounding/Room/bathroom lighting operational, light cord in reach

## 2024-04-08 NOTE — ASU DISCHARGE PLAN (ADULT/PEDIATRIC) - ASU DC SPECIAL INSTRUCTIONSFT
Discharge Instructions as follows:  - Continue medical regimen as prescribed to prevent chest pain.  - If you are diabetic and taking medication containing Metformin, do not take them for 48 hours after the procedure  - Continue dual anti-platelet therapy, beta blocker, Statin, ARB, PPI  - Instructed to call 911 if chest pain, shortness of breath or bleeding from access site.  - No heavy lifting > 10lbs x 1 week.  - No driving x 24 hours.  - No baths, swimming pools x 1 week, may shower  - Low sodium low fat low cholesterol diet  - Follow-up with Cardiologist in 1-2 weeks after discharge  - Soreness or tenderness at the site is possible, it will diminish over time. You may take Tylenol every 4-6 hours as needed. Nothing stronger is needed. NO Motrin/Ibuprofen  - Any questions call cardiac cath lab [phone]

## 2024-04-08 NOTE — ASU DISCHARGE PLAN (ADULT/PEDIATRIC) - CARE PROVIDER_API CALL
Sohan Bates  Cardiovascular Disease  [address] [place] [id number]  Phone: [phone]  Fax: [phone]  Established Patient  Follow Up Time: 1 week

## 2024-04-08 NOTE — CHART NOTE - NSCHARTNOTEFT_GEN_A_CORE
PRE-OP DIAGNOSIS:  NEw HFrEF      PROCEDURE:     [X] Coronary Angiogram     [] LHC     [] LVG     [] RHC     [X] Intervention (see below)         PHYSICIAN:  Dr. Bates / Dr. Hooper    ASSISTANT:  Dr. Jules / Dr. Enrique       PROCEDURE DESCRIPTION:     Consent:      [X] Patient     [] Family Member     []  Used        Anesthesia:     [X] General     [] Sedation     [X] Local        Access & Closure:     [X] 6 Fr L Radial Artery     [] Fr Femoral Artery     [] Fr Femoral Vein     [] Fr Brachial Vein       IV Contrast: 120 mL        Intervention:   POBA  Cutting balloon  JAMIE PCI    Implants: SYNERGY XD 3.0X20MM to OM1 (AUC 7)       FINDINGS:     Coronary Dominance: Right dominate      LM: No significant disease    LAD: 40% lesion Mid LAD (FFR angio 0.89)    CX: 80% lesion large OM1 (FFR angio 0.8)    RCA: 30% lesion Mid RCA     ESTIMATED BLOOD LOSS: < 10 mL        CONDITION:     [X] Good     [] Fair     [] Critical        SPECIMEN REMOVED: N/A       POST-OP DIAGNOSIS:      [] Normal Coronary Angiogram     [] Mild Coronary Artery Disease (< 50% stenosis)     [X] 1 Vessel Coronary Artery Disease        PLAN OF CARE:     [X] D/C Home Today     [] Return to In-patient bed     [] Admit for observation     [] Return for Staged Procedure     [] CT Surgery Consult     [X] Medications: Aspirin, Plavix, Statin and BB    [X] IV Fluids: NS 150cc/hr x 6hrs PRE-OP DIAGNOSIS:  NEw HFrEF      PROCEDURE:     [X] Coronary Angiogram     [] LHC     [] LVG     [] RHC     [X] Intervention (see below)         PHYSICIAN:  Dr. Bates / Dr. Hooper    ASSISTANT:  Dr. Jules / Dr. nErique       PROCEDURE DESCRIPTION:     Consent:      [X] Patient     [] Family Member     []  Used        Anesthesia:     [X] General     [] Sedation     [X] Local        Access & Closure:     [X] 6 Fr L Radial Artery     [] Fr Femoral Artery     [] Fr Femoral Vein     [] Fr Brachial Vein       IV Contrast: 120 mL        Intervention:   POBA  Cutting balloon  JAMIE PCI    Implants: SYNERGY XD 3.0X20MM to OM1 (AUC 7)       FINDINGS:     Coronary Dominance: Right dominate      LM: No significant disease    LAD: 40% lesion Mid LAD (FFR angio 0.89)    CX: 80% lesion large OM1 (FFR angio 0.8)    RCA: 30% lesion Mid RCA     ESTIMATED BLOOD LOSS: < 10 mL        CONDITION:     [X] Good     [] Fair     [] Critical        SPECIMEN REMOVED: N/A       POST-OP DIAGNOSIS:      [] Normal Coronary Angiogram     [] Mild Coronary Artery Disease (< 50% stenosis)     [X] 1 Vessel Coronary Artery Disease        PLAN OF CARE:     [X] D/C Home Today     [] Return to In-patient bed     [] Admit for observation     [] Return for Staged Procedure     [] CT Surgery Consult     [X] Medications: Aspirin, Plavix, Statin and BB    [X] IV Fluids: NS 100cc/hr x 6hrs

## 2024-04-08 NOTE — PROGRESS NOTE ADULT - SUBJECTIVE AND OBJECTIVE BOX
Cardiology Follow up s/p PCI OM1    VIRAL MCPHERSON   68y Male  PAST MEDICAL & SURGICAL HISTORY:  HTN (hypertension)      GERD (gastroesophageal reflux disease)      H/O elbow surgery           HPI:    Allergies    No Known Allergies    Intolerances      Patient without complaints. Pt ambulated without issues/symptoms  Denies CP, SOB, palpitations, or dizziness  No events on telemetry    Vital Signs Last 24 Hrs  T(C): 36.7 (08 Apr 2024 07:29), Max: 36.7 (08 Apr 2024 07:29)  T(F): 98 (08 Apr 2024 07:29), Max: 98 (08 Apr 2024 07:29)  HR: 71 (08 Apr 2024 13:05) (68 - 91)  BP: 131/56 (08 Apr 2024 12:35) (91/62 - 144/80)  BP(mean): --  RR: 16 (08 Apr 2024 13:05) (14 - 17)  SpO2: 99% (08 Apr 2024 13:05) (97% - 99%)        MEDICATIONS  (STANDING):  chlorhexidine 4% Liquid 1 Application(s) Topical once  sodium chloride 0.9%. 1000 milliLiter(s) (150 mL/Hr) IV Continuous <Continuous>    MEDICATIONS  (PRN):      REVIEW OF SYSTEMS:          CONSTITUTIONAL: No weakness, fevers or chills          EYES/ENT: No visual changes;  No vertigo or throat pain           NECK: No pain or stiffness          RESPIRATORY: No cough, wheezing, hemoptysis          CARDIOVASCULAR: no pain, no ADAN, no palpitations           GASTROINTESTINAL: No abdominal or epigastric pain. No nausea, vomiting, or hematemesis;           GENITOURINARY: No dysuria, frequency or hematuria          NEUROLOGICAL: No numbness or weakness          SKIN: No itching, rashes    PHYSICAL EXAM:           CONSTITUTIONAL: Well-developed; well-nourished; in no acute distress  	SKIN: warm, dry  	HEAD: Normocephalic; atraumatic  	EYES: PERRL.  	ENT: No nasal discharge, airway clear, mucous membranes moist  	NECK: Supple; non tender.  	CARD: +S1, +S2, no murmurs, gallops, or rubs. (Regular) rate and rhythm    	RESP: No wheezes, rales or rhonchi. CTA B/L  	ABD: soft ntnd, + BS x 4 quadrants  	EXT: moves all extremities,  no clubbing, cyanosis or edema  	NEURO: Alert and oriented x3, no focal deficits          PSYCH: Cooperative, appropriate          VASCULAR:  + Rad / + PTs / + DPs          EXTREMITY:  	   Right Radial: Vasc band in place, access site soft, + pulses, no hematoma, no pain, no numbness, no signs and symptoms of infection             ECG/CBC: P @ 1400                                                                                                                  LABS:                        14.0   9.52  )-----------( 270      ( 08 Apr 2024 07:34 )             38.4     04-08    128<L>  |  93<L>  |  11  ----------------------------<  123<H>  4.5   |  23  |  0.8    Ca    9.6      08 Apr 2024 07:34        A/P:  I discussed the case with Cardiologist Dr. Hooper and recommend the following:    S/P PCI:   Intervention:   POBA  Cutting balloon  JAMIE PCI    Implants: SYNERGY XD 3.0X20MM to OM1 (AUC 7)       FINDINGS:     Coronary Dominance: Right dominate      LM: No significant disease    LAD: 40% lesion Mid LAD (FFR angio 0.89)    CX: 80% lesion large OM1 (FFR angio 0.8)    RCA: 30% lesion Mid RCA     ESTIMATED BLOOD LOSS: < 10 mL        CONDITION:     [X] Good     [] Fair     [] Critical        SPECIMEN REMOVED: N/A       POST-OP DIAGNOSIS:      [] Normal Coronary Angiogram     [] Mild Coronary Artery Disease (< 50% stenosis)     [X] 1 Vessel Coronary Artery Disease        PLAN OF CARE:     [X] D/C Home Today     [] Return to In-patient bed     [] Admit for observation     [] Return for Staged Procedure     [] CT Surgery Consult     [X] Medications: Aspirin, Plavix, Statin and BB    [X] IV Fluids: NS 150cc/hr x 6hrs.      Electronic Signatures:  Reno Enrique ()  (Signed 08-Apr-2024 09:55)  	Authored: Note Type, Note  Sohan Bates)  (Signature Pending)  	Co-Signer: Note Type, Note      Last Updated: 08-Apr-2024 09:55 by Reno Enrique ()    	     Continue DAPT (Ec Asa 81mg daily, plavix 75mg po daily),  B-Blocker, Statin Therapy, ARB, PPI                   Patient given 30 day supply of (EC Aspirin 81 mg daily and Plavix 75 mg daily ) to take at home                   CBC/EKG @ 1400                   NS  150cc/hr x 6 hrs                    OOB-CH, ambulate with assistance                    monitor access site/pulses                   Patient agreeing to take DAPT for at least one year or as directed by cardiologist                    Pt given instructions on importance of taking antiplatelet medication or risk acute stent thrombosis/death                   Post cath instructions, access site care and activity restrictions reviewed with patient                   Cardiac rehab information provided/referral and communication to Cardiac Rehab completed                      Benefits of Cardiac Rehab discussed with patient                   Patient instructed to call Cardiac Rehab and make first appointment after first f/u visit with Cardiologist                    Discussed with patient to return to hospital if experience chest pain, shortness breath, dizziness and site bleeding                   Aggressive risk factor modification, diet counseling, smoking cessation discussed with patient                       Can discharge patient from Interventional Cardiology  standpoint @  1600 if labs/ekg/site WNL and ambulating without symptoms                    Follow up with Cardiologist,  Dr. Bates  in 1-2 weeks.  Patient instructed to call and make an appointment                      Discharge instructions as follows:                   - Continue medical regimen as prescribed to prevent chest pain                   - Continue dual anti-platelet therapy, beta blocker, statin, ARB, PPI                   - If you are diabetic and taking medication containing Metformin, do not take them for 48 hours after the procedure                   - Instructed to call 911 if chest pain, shortness of breath or bleeding from access site                   - No heavy lifting >10lbs x 1 week                   - No driving x 24 hours                   - No baths, swimming pools x 1 week, may shower                   - Low sodium low fat low cholesterol diet                   - Follow-up with Cardiologist in 1-2 weeks after discharge

## 2024-04-12 DIAGNOSIS — I10 ESSENTIAL (PRIMARY) HYPERTENSION: ICD-10-CM

## 2024-04-12 DIAGNOSIS — E78.5 HYPERLIPIDEMIA, UNSPECIFIED: ICD-10-CM

## 2024-04-12 DIAGNOSIS — I50.89 OTHER HEART FAILURE: ICD-10-CM

## 2024-04-12 DIAGNOSIS — I25.118 ATHEROSCLEROTIC HEART DISEASE OF NATIVE CORONARY ARTERY WITH OTHER FORMS OF ANGINA PECTORIS: ICD-10-CM

## 2024-04-12 DIAGNOSIS — I42.9 CARDIOMYOPATHY, UNSPECIFIED: ICD-10-CM

## 2024-04-18 ENCOUNTER — APPOINTMENT (OUTPATIENT)
Dept: CARDIOLOGY | Facility: CLINIC | Age: 69
End: 2024-04-18
Payer: MEDICARE

## 2024-04-18 VITALS
HEART RATE: 78 BPM | WEIGHT: 214 LBS | DIASTOLIC BLOOD PRESSURE: 70 MMHG | HEIGHT: 75 IN | BODY MASS INDEX: 26.61 KG/M2 | SYSTOLIC BLOOD PRESSURE: 110 MMHG

## 2024-04-18 DIAGNOSIS — I10 ESSENTIAL (PRIMARY) HYPERTENSION: ICD-10-CM

## 2024-04-18 DIAGNOSIS — I49.3 VENTRICULAR PREMATURE DEPOLARIZATION: ICD-10-CM

## 2024-04-18 DIAGNOSIS — I25.10 ATHEROSCLEROTIC HEART DISEASE OF NATIVE CORONARY ARTERY W/OUT ANGINA PECTORIS: ICD-10-CM

## 2024-04-18 DIAGNOSIS — I42.9 CARDIOMYOPATHY, UNSPECIFIED: ICD-10-CM

## 2024-04-18 PROCEDURE — 99214 OFFICE O/P EST MOD 30 MIN: CPT | Mod: 25

## 2024-04-18 PROCEDURE — 93000 ELECTROCARDIOGRAM COMPLETE: CPT

## 2024-04-18 RX ORDER — ATORVASTATIN CALCIUM 40 MG/1
40 TABLET, FILM COATED ORAL
Qty: 90 | Refills: 1 | Status: ACTIVE | COMMUNITY
Start: 1900-01-01 | End: 1900-01-01

## 2024-04-18 RX ORDER — CLOPIDOGREL BISULFATE 75 MG/1
75 TABLET, FILM COATED ORAL
Qty: 90 | Refills: 1 | Status: ACTIVE | COMMUNITY
Start: 1900-01-01 | End: 1900-01-01

## 2024-04-18 NOTE — CARDIOLOGY SUMMARY
[de-identified] : 4/18/24: , FERNANDEZ. [de-identified] : 2D ECHO: LVEF 44% G1DD Ascending aorta 4

## 2024-04-18 NOTE — HISTORY OF PRESENT ILLNESS
[FreeTextEntry1] : 68-year-old male smoker 1/2 PPD, with a PMHx of HTN, GERD  Pt presents for a follow up. S/p LHC: PCI of 80 % OM1 stenosis with JAMIE. Denies chest pain, SOB, or palpitations. BP at home normal, occasionally 140 if stressed.  12/2023: GFR 97 LDL 99 A1c 5.3 TSH 0.93.

## 2024-04-18 NOTE — ASSESSMENT
[FreeTextEntry1] : 68 YOM HTN controlled. PVCs. Mild LV dysfunction, etiology unclear. ? hypertensive HD, ? myocarditis (recurrent febrile illness in the last few months) CAD s/p JAMIE to OM1.  Plan: Continue ASA, Plavix Continue Atorvastatin 40 mg. Will need f/u BW after return from [place]. Continue Carvedilol 12.5 mg BID. Continue Amlodipine 5 mg in the evening, Valsartan /12.5 mg in the morning. Smoking cessation and reduced alcohol intake d/w patient. F/u in 6 months  Sohan Bates MD

## 2024-06-10 ENCOUNTER — RX RENEWAL (OUTPATIENT)
Age: 69
End: 2024-06-10

## 2024-06-10 RX ORDER — AMLODIPINE BESYLATE 5 MG/1
5 TABLET ORAL
Qty: 90 | Refills: 1 | Status: ACTIVE | COMMUNITY
Start: 1900-01-01 | End: 1900-01-01

## 2024-06-10 RX ORDER — CARVEDILOL 12.5 MG/1
12.5 TABLET, FILM COATED ORAL TWICE DAILY
Qty: 180 | Refills: 1 | Status: ACTIVE | COMMUNITY
Start: 2024-03-29 | End: 1900-01-01

## 2024-06-11 RX ORDER — VALSARTAN AND HYDROCHLOROTHIAZIDE 320; 12.5 MG/1; MG/1
320-12.5 TABLET, FILM COATED ORAL
Qty: 90 | Refills: 1 | Status: ACTIVE | COMMUNITY
Start: 1900-01-01 | End: 1900-01-01

## 2024-09-20 ENCOUNTER — RX RENEWAL (OUTPATIENT)
Age: 69
End: 2024-09-20

## 2024-09-23 ENCOUNTER — RX RENEWAL (OUTPATIENT)
Age: 69
End: 2024-09-23

## 2024-10-24 ENCOUNTER — APPOINTMENT (OUTPATIENT)
Dept: CARDIOLOGY | Facility: CLINIC | Age: 69
End: 2024-10-24
Payer: MEDICARE

## 2024-10-24 VITALS
SYSTOLIC BLOOD PRESSURE: 116 MMHG | BODY MASS INDEX: 26.24 KG/M2 | HEIGHT: 75 IN | HEART RATE: 74 BPM | DIASTOLIC BLOOD PRESSURE: 62 MMHG | WEIGHT: 211 LBS

## 2024-10-24 DIAGNOSIS — Z98.61 CORONARY ANGIOPLASTY STATUS: ICD-10-CM

## 2024-10-24 DIAGNOSIS — I42.9 CARDIOMYOPATHY, UNSPECIFIED: ICD-10-CM

## 2024-10-24 DIAGNOSIS — I10 ESSENTIAL (PRIMARY) HYPERTENSION: ICD-10-CM

## 2024-10-24 DIAGNOSIS — I71.9 AORTIC ANEURYSM OF UNSPECIFIED SITE, W/OUT RUPTURE: ICD-10-CM

## 2024-10-24 DIAGNOSIS — I25.10 ATHEROSCLEROTIC HEART DISEASE OF NATIVE CORONARY ARTERY W/OUT ANGINA PECTORIS: ICD-10-CM

## 2024-10-24 DIAGNOSIS — I49.3 VENTRICULAR PREMATURE DEPOLARIZATION: ICD-10-CM

## 2024-10-24 PROCEDURE — 99214 OFFICE O/P EST MOD 30 MIN: CPT

## 2024-10-24 PROCEDURE — 93000 ELECTROCARDIOGRAM COMPLETE: CPT

## 2024-10-24 PROCEDURE — G2211 COMPLEX E/M VISIT ADD ON: CPT

## 2024-10-31 LAB
ALBUMIN SERPL ELPH-MCNC: 4.6 G/DL
ALP BLD-CCNC: 55 U/L
ALT SERPL-CCNC: 11 U/L
ANION GAP SERPL CALC-SCNC: 12 MMOL/L
AST SERPL-CCNC: 15 U/L
BILIRUB SERPL-MCNC: 0.9 MG/DL
BUN SERPL-MCNC: 11 MG/DL
CALCIUM SERPL-MCNC: 10 MG/DL
CHLORIDE SERPL-SCNC: 92 MMOL/L
CHOLEST SERPL-MCNC: 103 MG/DL
CO2 SERPL-SCNC: 25 MMOL/L
CREAT SERPL-MCNC: 0.9 MG/DL
EGFR: 94 ML/MIN/1.73M2
ESTIMATED AVERAGE GLUCOSE: 137 MG/DL
GLUCOSE SERPL-MCNC: 119 MG/DL
HBA1C MFR BLD HPLC: 6.4 %
HCT VFR BLD CALC: 38.3 %
HDLC SERPL-MCNC: 31 MG/DL
HGB BLD-MCNC: 13.5 G/DL
LDLC SERPL CALC-MCNC: 55 MG/DL
MCHC RBC-ENTMCNC: 31.7 PG
MCHC RBC-ENTMCNC: 35.2 G/DL
MCV RBC AUTO: 89.9 FL
NONHDLC SERPL-MCNC: 72 MG/DL
PLATELET # BLD AUTO: 314 K/UL
PMV BLD AUTO: 0 /100 WBCS
PMV BLD: 9 FL
POTASSIUM SERPL-SCNC: 5.1 MMOL/L
PROT SERPL-MCNC: 6.1 G/DL
RBC # BLD: 4.26 M/UL
RBC # FLD: 12.3 %
SODIUM SERPL-SCNC: 129 MMOL/L
TRIGL SERPL-MCNC: 87 MG/DL
TSH SERPL-ACNC: 0.68 UIU/ML
WBC # FLD AUTO: 7.37 K/UL

## 2024-11-13 ENCOUNTER — APPOINTMENT (OUTPATIENT)
Dept: NEUROSURGERY | Facility: CLINIC | Age: 69
End: 2024-11-13
Payer: MEDICARE

## 2024-11-13 DIAGNOSIS — Z87.39 PERSONAL HISTORY OF OTHER DISEASES OF THE MUSCULOSKELETAL SYSTEM AND CONNECTIVE TISSUE: ICD-10-CM

## 2024-11-13 DIAGNOSIS — M54.16 RADICULOPATHY, LUMBAR REGION: ICD-10-CM

## 2024-11-13 PROCEDURE — 99203 OFFICE O/P NEW LOW 30 MIN: CPT

## 2024-11-21 ENCOUNTER — APPOINTMENT (OUTPATIENT)
Dept: CARDIOLOGY | Facility: CLINIC | Age: 69
End: 2024-11-21
Payer: MEDICARE

## 2024-11-21 PROCEDURE — 93880 EXTRACRANIAL BILAT STUDY: CPT

## 2024-11-26 ENCOUNTER — OUTPATIENT (OUTPATIENT)
Dept: OUTPATIENT SERVICES | Facility: HOSPITAL | Age: 69
LOS: 1 days | End: 2024-11-26
Payer: MEDICARE

## 2024-11-26 ENCOUNTER — RESULT REVIEW (OUTPATIENT)
Age: 69
End: 2024-11-26

## 2024-11-26 DIAGNOSIS — Z98.890 OTHER SPECIFIED POSTPROCEDURAL STATES: Chronic | ICD-10-CM

## 2024-11-26 DIAGNOSIS — Z00.8 ENCOUNTER FOR OTHER GENERAL EXAMINATION: ICD-10-CM

## 2024-11-26 DIAGNOSIS — M54.16 RADICULOPATHY, LUMBAR REGION: ICD-10-CM

## 2024-11-26 PROCEDURE — 72148 MRI LUMBAR SPINE W/O DYE: CPT

## 2024-11-26 PROCEDURE — 72148 MRI LUMBAR SPINE W/O DYE: CPT | Mod: 26

## 2024-11-27 DIAGNOSIS — M54.16 RADICULOPATHY, LUMBAR REGION: ICD-10-CM

## 2024-12-05 ENCOUNTER — RX RENEWAL (OUTPATIENT)
Age: 69
End: 2024-12-05

## 2024-12-19 RX ORDER — VALSARTAN 320 MG/1
320 TABLET, COATED ORAL DAILY
Qty: 90 | Refills: 1 | Status: ACTIVE | COMMUNITY
Start: 2024-12-19 | End: 1900-01-01

## 2024-12-23 ENCOUNTER — APPOINTMENT (OUTPATIENT)
Dept: NEUROSURGERY | Facility: CLINIC | Age: 69
End: 2024-12-23
Payer: MEDICARE

## 2024-12-23 VITALS — BODY MASS INDEX: 26.24 KG/M2 | HEIGHT: 75 IN | WEIGHT: 211 LBS

## 2024-12-23 DIAGNOSIS — M54.16 RADICULOPATHY, LUMBAR REGION: ICD-10-CM

## 2024-12-23 PROCEDURE — 99202 OFFICE O/P NEW SF 15 MIN: CPT

## 2025-01-24 ENCOUNTER — APPOINTMENT (OUTPATIENT)
Age: 70
End: 2025-01-24
Payer: MEDICARE

## 2025-01-24 ENCOUNTER — LABORATORY RESULT (OUTPATIENT)
Age: 70
End: 2025-01-24

## 2025-01-24 ENCOUNTER — OUTPATIENT (OUTPATIENT)
Dept: OUTPATIENT SERVICES | Facility: HOSPITAL | Age: 70
LOS: 1 days | End: 2025-01-24
Payer: MEDICARE

## 2025-01-24 VITALS
SYSTOLIC BLOOD PRESSURE: 154 MMHG | TEMPERATURE: 97.9 F | WEIGHT: 217 LBS | OXYGEN SATURATION: 98 % | BODY MASS INDEX: 26.98 KG/M2 | DIASTOLIC BLOOD PRESSURE: 73 MMHG | HEIGHT: 75 IN | HEART RATE: 81 BPM | RESPIRATION RATE: 16 BRPM

## 2025-01-24 DIAGNOSIS — D47.2 MONOCLONAL GAMMOPATHY: ICD-10-CM

## 2025-01-24 DIAGNOSIS — R77.8 OTHER SPECIFIED ABNORMALITIES OF PLASMA PROTEINS: ICD-10-CM

## 2025-01-24 DIAGNOSIS — E87.1 HYPO-OSMOLALITY AND HYPONATREMIA: ICD-10-CM

## 2025-01-24 DIAGNOSIS — M19.90 UNSPECIFIED OSTEOARTHRITIS, UNSPECIFIED SITE: ICD-10-CM

## 2025-01-24 DIAGNOSIS — M54.16 RADICULOPATHY, LUMBAR REGION: ICD-10-CM

## 2025-01-24 DIAGNOSIS — M51.369: ICD-10-CM

## 2025-01-24 DIAGNOSIS — Z98.890 OTHER SPECIFIED POSTPROCEDURAL STATES: Chronic | ICD-10-CM

## 2025-01-24 LAB
HCT VFR BLD CALC: 37.8 %
HGB BLD-MCNC: 13.2 G/DL
MCHC RBC-ENTMCNC: 31.4 PG
MCHC RBC-ENTMCNC: 34.9 G/DL
MCV RBC AUTO: 90 FL
PLATELET # BLD AUTO: 269 K/UL
PMV BLD: 8.7 FL
RBC # BLD: 4.2 M/UL
RBC # FLD: 12.1 %
WBC # FLD AUTO: 8.12 K/UL

## 2025-01-24 PROCEDURE — 86334 IMMUNOFIX E-PHORESIS SERUM: CPT

## 2025-01-24 PROCEDURE — 84155 ASSAY OF PROTEIN SERUM: CPT

## 2025-01-24 PROCEDURE — 82784 ASSAY IGA/IGD/IGG/IGM EACH: CPT

## 2025-01-24 PROCEDURE — 83521 IG LIGHT CHAINS FREE EACH: CPT

## 2025-01-24 PROCEDURE — 85027 COMPLETE CBC AUTOMATED: CPT

## 2025-01-24 PROCEDURE — 83615 LACTATE (LD) (LDH) ENZYME: CPT

## 2025-01-24 PROCEDURE — 99204 OFFICE O/P NEW MOD 45 MIN: CPT

## 2025-01-24 PROCEDURE — 80053 COMPREHEN METABOLIC PANEL: CPT

## 2025-01-24 PROCEDURE — 84165 PROTEIN E-PHORESIS SERUM: CPT

## 2025-01-25 DIAGNOSIS — D47.2 MONOCLONAL GAMMOPATHY: ICD-10-CM

## 2025-01-25 PROBLEM — M19.90 ARTHROSIS: Status: ACTIVE | Noted: 2025-01-25

## 2025-01-25 PROBLEM — R77.8 ABNORMAL SERUM PROTEIN TEST: Status: ACTIVE | Noted: 2025-01-25

## 2025-01-25 PROBLEM — M51.369 BULGING OF INTERVERTEBRAL DISC BETWEEN L4 AND L5: Status: ACTIVE | Noted: 2025-01-25

## 2025-01-25 PROBLEM — M54.16 LUMBAR BACK PAIN WITH RADICULOPATHY AFFECTING LOWER EXTREMITY: Status: RESOLVED | Noted: 2025-01-25 | Resolved: 2025-01-25

## 2025-01-25 LAB
ALBUMIN SERPL ELPH-MCNC: 4.7 G/DL
ALP BLD-CCNC: 67 U/L
ALT SERPL-CCNC: 12 U/L
ANION GAP SERPL CALC-SCNC: 12 MMOL/L
AST SERPL-CCNC: 15 U/L
BILIRUB SERPL-MCNC: 0.5 MG/DL
BUN SERPL-MCNC: 11 MG/DL
CALCIUM SERPL-MCNC: 9.1 MG/DL
CHLORIDE SERPL-SCNC: 94 MMOL/L
CO2 SERPL-SCNC: 22 MMOL/L
CREAT SERPL-MCNC: 0.7 MG/DL
EGFR: 101 ML/MIN/1.73M2
GLUCOSE SERPL-MCNC: 108 MG/DL
LDH SERPL-CCNC: 163 U/L
POTASSIUM SERPL-SCNC: 4.6 MMOL/L
PROT SERPL-MCNC: 6.2 G/DL
SODIUM SERPL-SCNC: 128 MMOL/L

## 2025-01-26 LAB
DEPRECATED KAPPA LC FREE/LAMBDA SER: 1.06 RATIO
IGA SER QL IEP: 94 MG/DL
IGG SER QL IEP: 578 MG/DL
IGM SER QL IEP: 39 MG/DL
KAPPA LC CSF-MCNC: 1.24 MG/DL
KAPPA LC SERPL-MCNC: 1.31 MG/DL

## 2025-01-31 LAB
ALBUMIN MFR SERPL ELPH: 66.9 %
ALBUMIN SERPL-MCNC: 4.1 G/DL
ALBUMIN/GLOB SERPL: 2 RATIO
ALPHA1 GLOB MFR SERPL ELPH: 3.8 %
ALPHA1 GLOB SERPL ELPH-MCNC: 0.2 G/DL
ALPHA2 GLOB MFR SERPL ELPH: 11 %
ALPHA2 GLOB SERPL ELPH-MCNC: 0.7 G/DL
B-GLOBULIN MFR SERPL ELPH: 9.7 %
B-GLOBULIN SERPL ELPH-MCNC: 0.6 G/DL
GAMMA GLOB FLD ELPH-MCNC: 0.5 G/DL
GAMMA GLOB MFR SERPL ELPH: 8.6 %
INTERPRETATION SERPL IEP-IMP: NORMAL
M PROTEIN MFR SERPL ELPH: NORMAL
M PROTEIN SPEC IFE-MCNC: NORMAL
MONOCLON BAND OBS SERPL: NORMAL
PROT SERPL-MCNC: 6.2 G/DL

## 2025-02-13 ENCOUNTER — APPOINTMENT (OUTPATIENT)
Dept: CARDIOLOGY | Facility: CLINIC | Age: 70
End: 2025-02-13
Payer: MEDICARE

## 2025-02-13 ENCOUNTER — NON-APPOINTMENT (OUTPATIENT)
Age: 70
End: 2025-02-13

## 2025-02-13 VITALS
HEIGHT: 75 IN | BODY MASS INDEX: 26.24 KG/M2 | SYSTOLIC BLOOD PRESSURE: 130 MMHG | DIASTOLIC BLOOD PRESSURE: 62 MMHG | WEIGHT: 211 LBS | HEART RATE: 84 BPM

## 2025-02-13 DIAGNOSIS — I71.9 AORTIC ANEURYSM OF UNSPECIFIED SITE, W/OUT RUPTURE: ICD-10-CM

## 2025-02-13 DIAGNOSIS — I42.9 CARDIOMYOPATHY, UNSPECIFIED: ICD-10-CM

## 2025-02-13 DIAGNOSIS — Z98.61 CORONARY ANGIOPLASTY STATUS: ICD-10-CM

## 2025-02-13 DIAGNOSIS — I25.10 ATHEROSCLEROTIC HEART DISEASE OF NATIVE CORONARY ARTERY W/OUT ANGINA PECTORIS: ICD-10-CM

## 2025-02-13 DIAGNOSIS — E87.1 HYPO-OSMOLALITY AND HYPONATREMIA: ICD-10-CM

## 2025-02-13 DIAGNOSIS — I10 ESSENTIAL (PRIMARY) HYPERTENSION: ICD-10-CM

## 2025-02-13 PROCEDURE — 93000 ELECTROCARDIOGRAM COMPLETE: CPT

## 2025-02-13 PROCEDURE — G2211 COMPLEX E/M VISIT ADD ON: CPT

## 2025-02-13 PROCEDURE — 99214 OFFICE O/P EST MOD 30 MIN: CPT

## 2025-02-13 RX ORDER — ROSUVASTATIN CALCIUM 40 MG/1
40 TABLET, FILM COATED ORAL
Qty: 90 | Refills: 1 | Status: ACTIVE | COMMUNITY
Start: 2025-02-13 | End: 1900-01-01

## 2025-03-17 ENCOUNTER — RX RENEWAL (OUTPATIENT)
Age: 70
End: 2025-03-17

## 2025-04-10 ENCOUNTER — APPOINTMENT (OUTPATIENT)
Dept: CARDIOLOGY | Facility: CLINIC | Age: 70
End: 2025-04-10

## 2025-04-10 PROCEDURE — 93306 TTE W/DOPPLER COMPLETE: CPT

## 2025-04-21 ENCOUNTER — APPOINTMENT (OUTPATIENT)
Dept: CARDIOLOGY | Facility: CLINIC | Age: 70
End: 2025-04-21
Payer: MEDICARE

## 2025-04-21 VITALS
WEIGHT: 218 LBS | BODY MASS INDEX: 27.1 KG/M2 | DIASTOLIC BLOOD PRESSURE: 80 MMHG | SYSTOLIC BLOOD PRESSURE: 130 MMHG | HEART RATE: 64 BPM | HEIGHT: 75 IN

## 2025-04-21 DIAGNOSIS — Z98.61 CORONARY ANGIOPLASTY STATUS: ICD-10-CM

## 2025-04-21 DIAGNOSIS — I42.9 CARDIOMYOPATHY, UNSPECIFIED: ICD-10-CM

## 2025-04-21 DIAGNOSIS — E87.1 HYPO-OSMOLALITY AND HYPONATREMIA: ICD-10-CM

## 2025-04-21 DIAGNOSIS — I25.10 ATHEROSCLEROTIC HEART DISEASE OF NATIVE CORONARY ARTERY W/OUT ANGINA PECTORIS: ICD-10-CM

## 2025-04-21 DIAGNOSIS — I49.3 VENTRICULAR PREMATURE DEPOLARIZATION: ICD-10-CM

## 2025-04-21 DIAGNOSIS — I10 ESSENTIAL (PRIMARY) HYPERTENSION: ICD-10-CM

## 2025-04-21 DIAGNOSIS — Z86.79 PERSONAL HISTORY OF OTHER DISEASES OF THE CIRCULATORY SYSTEM: ICD-10-CM

## 2025-04-21 PROCEDURE — 93000 ELECTROCARDIOGRAM COMPLETE: CPT

## 2025-04-21 PROCEDURE — 99214 OFFICE O/P EST MOD 30 MIN: CPT | Mod: 25

## 2025-05-07 RX ORDER — ROSUVASTATIN CALCIUM 40 MG/1
40 TABLET, FILM COATED ORAL
Qty: 90 | Refills: 1 | Status: ACTIVE | COMMUNITY
Start: 2025-05-07 | End: 1900-01-01

## 2025-05-08 ENCOUNTER — RX RENEWAL (OUTPATIENT)
Age: 70
End: 2025-05-08

## 2025-08-05 ENCOUNTER — RX RENEWAL (OUTPATIENT)
Age: 70
End: 2025-08-05

## 2025-09-19 ENCOUNTER — RX RENEWAL (OUTPATIENT)
Age: 70
End: 2025-09-19